# Patient Record
Sex: FEMALE | Race: WHITE
[De-identification: names, ages, dates, MRNs, and addresses within clinical notes are randomized per-mention and may not be internally consistent; named-entity substitution may affect disease eponyms.]

---

## 2018-01-09 ENCOUNTER — HOSPITAL ENCOUNTER (OUTPATIENT)
Dept: HOSPITAL 19 - COL.CARD | Age: 68
End: 2018-01-09
Attending: INTERNAL MEDICINE
Payer: MEDICARE

## 2018-01-09 DIAGNOSIS — R00.1: Primary | ICD-10-CM

## 2018-01-22 ENCOUNTER — HOSPITAL ENCOUNTER (OUTPATIENT)
Dept: HOSPITAL 19 - MC.RAD | Age: 68
End: 2018-01-22
Attending: INTERNAL MEDICINE
Payer: MEDICARE

## 2018-01-22 DIAGNOSIS — Z12.31: Primary | ICD-10-CM

## 2018-02-12 ENCOUNTER — HOSPITAL ENCOUNTER (OUTPATIENT)
Dept: HOSPITAL 19 - COL.RAD | Age: 68
End: 2018-02-12
Attending: INTERNAL MEDICINE
Payer: MEDICARE

## 2018-02-12 DIAGNOSIS — N28.1: Primary | ICD-10-CM

## 2018-02-12 DIAGNOSIS — N28.9: ICD-10-CM

## 2018-05-05 ENCOUNTER — HOSPITAL ENCOUNTER (EMERGENCY)
Dept: HOSPITAL 19 - COL.ER | Age: 68
Discharge: HOME | End: 2018-05-05
Payer: MEDICARE

## 2018-05-05 VITALS — SYSTOLIC BLOOD PRESSURE: 155 MMHG | HEART RATE: 42 BPM | DIASTOLIC BLOOD PRESSURE: 55 MMHG

## 2018-05-05 VITALS — HEIGHT: 65.98 IN | WEIGHT: 250.45 LBS | BODY MASS INDEX: 40.25 KG/M2

## 2018-05-05 VITALS — TEMPERATURE: 99.3 F

## 2018-05-05 DIAGNOSIS — Z79.82: ICD-10-CM

## 2018-05-05 DIAGNOSIS — Z79.84: ICD-10-CM

## 2018-05-05 DIAGNOSIS — M54.16: Primary | ICD-10-CM

## 2018-05-05 DIAGNOSIS — Z79.52: ICD-10-CM

## 2018-05-05 LAB
PH UR STRIP.AUTO: 7 [PH] (ref 5–8)
RBC # UR: (no result) /HPF
SP GR UR STRIP.AUTO: 1.01 (ref 1–1.03)
SQUAMOUS # URNS: (no result) /HPF
URN COLLECT METHOD CLASS: (no result)
UROBILINOGEN UR STRIP.AUTO-MCNC: 2 MG/DL

## 2019-01-23 ENCOUNTER — HOSPITAL ENCOUNTER (OUTPATIENT)
Dept: HOSPITAL 19 - COL.VAS | Age: 69
End: 2019-01-23
Attending: INTERNAL MEDICINE
Payer: MEDICARE

## 2019-01-23 DIAGNOSIS — I08.3: Primary | ICD-10-CM

## 2019-03-26 ENCOUNTER — HOSPITAL ENCOUNTER (OUTPATIENT)
Dept: HOSPITAL 19 - MC.RAD | Age: 69
End: 2019-03-26
Attending: INTERNAL MEDICINE
Payer: MEDICARE

## 2019-03-26 DIAGNOSIS — N63.42: ICD-10-CM

## 2019-03-26 DIAGNOSIS — Z12.31: Primary | ICD-10-CM

## 2019-04-01 ENCOUNTER — HOSPITAL ENCOUNTER (OUTPATIENT)
Dept: HOSPITAL 19 - MC.RAD | Age: 69
End: 2019-04-01
Attending: NURSE PRACTITIONER
Payer: MEDICARE

## 2019-04-01 DIAGNOSIS — N63.20: Primary | ICD-10-CM

## 2020-01-28 ENCOUNTER — HOSPITAL ENCOUNTER (OUTPATIENT)
Dept: HOSPITAL 19 - COL.VAS | Age: 70
End: 2020-01-28
Attending: INTERNAL MEDICINE
Payer: MEDICARE

## 2020-01-28 DIAGNOSIS — I08.0: Primary | ICD-10-CM

## 2021-02-17 ENCOUNTER — HOSPITAL ENCOUNTER (OUTPATIENT)
Dept: HOSPITAL 19 - MC.RAD | Age: 71
End: 2021-02-17
Attending: INTERNAL MEDICINE
Payer: MEDICARE

## 2021-02-17 DIAGNOSIS — Z12.31: Primary | ICD-10-CM

## 2021-03-26 ENCOUNTER — HOSPITAL ENCOUNTER (OUTPATIENT)
Dept: HOSPITAL 19 - EUO | Age: 71
Discharge: HOME | End: 2021-03-26
Payer: MEDICARE

## 2021-03-26 VITALS — DIASTOLIC BLOOD PRESSURE: 75 MMHG | SYSTOLIC BLOOD PRESSURE: 118 MMHG | HEART RATE: 44 BPM | TEMPERATURE: 98.7 F

## 2021-03-26 VITALS — SYSTOLIC BLOOD PRESSURE: 144 MMHG | DIASTOLIC BLOOD PRESSURE: 86 MMHG | TEMPERATURE: 98.6 F | HEART RATE: 51 BPM

## 2021-03-26 VITALS — DIASTOLIC BLOOD PRESSURE: 71 MMHG | SYSTOLIC BLOOD PRESSURE: 135 MMHG | TEMPERATURE: 98.6 F | HEART RATE: 47 BPM

## 2021-03-26 VITALS — HEART RATE: 45 BPM | TEMPERATURE: 98.8 F | SYSTOLIC BLOOD PRESSURE: 136 MMHG | DIASTOLIC BLOOD PRESSURE: 69 MMHG

## 2021-03-26 VITALS — DIASTOLIC BLOOD PRESSURE: 62 MMHG | HEART RATE: 47 BPM | TEMPERATURE: 98.6 F | SYSTOLIC BLOOD PRESSURE: 137 MMHG

## 2021-03-26 VITALS — TEMPERATURE: 98.4 F | DIASTOLIC BLOOD PRESSURE: 71 MMHG | SYSTOLIC BLOOD PRESSURE: 126 MMHG | HEART RATE: 48 BPM

## 2021-03-26 VITALS — TEMPERATURE: 98.7 F | DIASTOLIC BLOOD PRESSURE: 74 MMHG | HEART RATE: 46 BPM | SYSTOLIC BLOOD PRESSURE: 113 MMHG

## 2021-03-26 VITALS — HEART RATE: 44 BPM | SYSTOLIC BLOOD PRESSURE: 121 MMHG | DIASTOLIC BLOOD PRESSURE: 70 MMHG

## 2021-03-26 VITALS — SYSTOLIC BLOOD PRESSURE: 123 MMHG | TEMPERATURE: 98.5 F | HEART RATE: 50 BPM | DIASTOLIC BLOOD PRESSURE: 70 MMHG

## 2021-03-26 VITALS — DIASTOLIC BLOOD PRESSURE: 90 MMHG | TEMPERATURE: 98.8 F | SYSTOLIC BLOOD PRESSURE: 156 MMHG | HEART RATE: 45 BPM

## 2021-03-26 VITALS — BODY MASS INDEX: 32.74 KG/M2 | HEIGHT: 65.98 IN | WEIGHT: 203.71 LBS

## 2021-03-26 VITALS — HEART RATE: 46 BPM | SYSTOLIC BLOOD PRESSURE: 136 MMHG | TEMPERATURE: 98.7 F | DIASTOLIC BLOOD PRESSURE: 69 MMHG

## 2021-03-26 DIAGNOSIS — D53.9: Primary | ICD-10-CM

## 2021-03-26 PROCEDURE — P9040 RBC LEUKOREDUCED IRRADIATED: HCPCS

## 2021-04-16 ENCOUNTER — HOSPITAL ENCOUNTER (OUTPATIENT)
Dept: HOSPITAL 19 - SDCO | Age: 71
Discharge: HOME | End: 2021-04-16
Attending: SURGERY
Payer: MEDICARE

## 2021-04-16 VITALS — TEMPERATURE: 98 F

## 2021-04-16 VITALS — DIASTOLIC BLOOD PRESSURE: 38 MMHG | SYSTOLIC BLOOD PRESSURE: 119 MMHG | HEART RATE: 48 BPM

## 2021-04-16 VITALS — DIASTOLIC BLOOD PRESSURE: 43 MMHG | SYSTOLIC BLOOD PRESSURE: 126 MMHG | HEART RATE: 49 BPM

## 2021-04-16 VITALS — SYSTOLIC BLOOD PRESSURE: 141 MMHG | HEART RATE: 59 BPM | DIASTOLIC BLOOD PRESSURE: 47 MMHG

## 2021-04-16 VITALS — DIASTOLIC BLOOD PRESSURE: 36 MMHG | HEART RATE: 47 BPM | SYSTOLIC BLOOD PRESSURE: 124 MMHG

## 2021-04-16 VITALS — WEIGHT: 207.23 LBS | HEIGHT: 66 IN | BODY MASS INDEX: 33.3 KG/M2

## 2021-04-16 VITALS — DIASTOLIC BLOOD PRESSURE: 46 MMHG | HEART RATE: 56 BPM | TEMPERATURE: 98.2 F | SYSTOLIC BLOOD PRESSURE: 161 MMHG

## 2021-04-16 DIAGNOSIS — Z79.84: ICD-10-CM

## 2021-04-16 DIAGNOSIS — Z80.8: ICD-10-CM

## 2021-04-16 DIAGNOSIS — I10: ICD-10-CM

## 2021-04-16 DIAGNOSIS — G47.33: ICD-10-CM

## 2021-04-16 DIAGNOSIS — D53.9: ICD-10-CM

## 2021-04-16 DIAGNOSIS — Z20.822: ICD-10-CM

## 2021-04-16 DIAGNOSIS — J45.909: ICD-10-CM

## 2021-04-16 DIAGNOSIS — Z79.899: ICD-10-CM

## 2021-04-16 DIAGNOSIS — D63.0: ICD-10-CM

## 2021-04-16 DIAGNOSIS — Z79.890: ICD-10-CM

## 2021-04-16 DIAGNOSIS — D46.9: Primary | ICD-10-CM

## 2021-04-16 DIAGNOSIS — E11.42: ICD-10-CM

## 2021-04-16 DIAGNOSIS — E07.9: ICD-10-CM

## 2021-04-16 DIAGNOSIS — Z80.0: ICD-10-CM

## 2021-04-16 DIAGNOSIS — E78.5: ICD-10-CM

## 2021-04-16 PROCEDURE — C1788 PORT, INDWELLING, IMP: HCPCS

## 2021-04-16 NOTE — NUR
TO RM 8 PER CART FROM OR. ALERT ORIENTED X3, TALKING TO STAFF AND .
DRESSING OVER PORT SITE CLEAN DRY INTACT WITH PORT ACCESSED.
INCISION ABOVE PORT SITE WITH WEISS SET CLEAN DRY INTACT.
DENIES PAIN OR DISCOMFORT

## 2021-04-16 NOTE — NUR
RECEIVED DISCHARGE INSTRUCTIONS AND VERBALIZED UNDERSTANDING.
PATIENT TO GO TO [facility] UPON DISCHARGE.
DISCONTINUED IV AND INT- CATHETER INTACT.
PATIENT GETTING DRESSED.

## 2021-05-01 ENCOUNTER — HOSPITAL ENCOUNTER (OUTPATIENT)
Dept: HOSPITAL 19 - EUO | Age: 71
LOS: 1 days | Discharge: HOME | End: 2021-05-02
Payer: MEDICARE

## 2021-05-01 VITALS — HEART RATE: 44 BPM | SYSTOLIC BLOOD PRESSURE: 135 MMHG | DIASTOLIC BLOOD PRESSURE: 53 MMHG

## 2021-05-01 VITALS — HEART RATE: 44 BPM | SYSTOLIC BLOOD PRESSURE: 134 MMHG | DIASTOLIC BLOOD PRESSURE: 44 MMHG

## 2021-05-01 VITALS — DIASTOLIC BLOOD PRESSURE: 39 MMHG | SYSTOLIC BLOOD PRESSURE: 127 MMHG | HEART RATE: 49 BPM | TEMPERATURE: 98.1 F

## 2021-05-01 VITALS — HEART RATE: 51 BPM | SYSTOLIC BLOOD PRESSURE: 134 MMHG | TEMPERATURE: 98.3 F | DIASTOLIC BLOOD PRESSURE: 46 MMHG

## 2021-05-01 VITALS — DIASTOLIC BLOOD PRESSURE: 43 MMHG | HEART RATE: 45 BPM | SYSTOLIC BLOOD PRESSURE: 136 MMHG

## 2021-05-01 VITALS — HEART RATE: 44 BPM | DIASTOLIC BLOOD PRESSURE: 49 MMHG | SYSTOLIC BLOOD PRESSURE: 1138 MMHG

## 2021-05-01 VITALS — DIASTOLIC BLOOD PRESSURE: 44 MMHG | HEART RATE: 47 BPM | SYSTOLIC BLOOD PRESSURE: 128 MMHG

## 2021-05-01 DIAGNOSIS — R53.83: ICD-10-CM

## 2021-05-01 DIAGNOSIS — D46.21: Primary | ICD-10-CM

## 2021-05-01 DIAGNOSIS — D53.9: ICD-10-CM

## 2021-05-01 PROCEDURE — P9040 RBC LEUKOREDUCED IRRADIATED: HCPCS

## 2021-05-01 NOTE — NUR
Transfusion complete, port deaccessed, flushed with NS, followed by heparin
flush.  Pt tolerated transfusion well with no adverse reactions noted.

## 2021-07-02 ENCOUNTER — HOSPITAL ENCOUNTER (OUTPATIENT)
Dept: HOSPITAL 19 - EUO | Age: 71
LOS: 1 days | Discharge: HOME | End: 2021-07-03
Payer: MEDICARE

## 2021-07-02 VITALS — WEIGHT: 207.23 LBS | BODY MASS INDEX: 33.3 KG/M2 | HEIGHT: 65.98 IN

## 2021-07-02 DIAGNOSIS — D53.9: ICD-10-CM

## 2021-07-02 DIAGNOSIS — D46.21: Primary | ICD-10-CM

## 2021-07-02 PROCEDURE — P9040 RBC LEUKOREDUCED IRRADIATED: HCPCS

## 2021-07-03 VITALS — TEMPERATURE: 98.2 F | SYSTOLIC BLOOD PRESSURE: 142 MMHG | HEART RATE: 50 BPM | DIASTOLIC BLOOD PRESSURE: 62 MMHG

## 2021-07-03 VITALS — TEMPERATURE: 98.1 F | HEART RATE: 54 BPM | DIASTOLIC BLOOD PRESSURE: 52 MMHG | SYSTOLIC BLOOD PRESSURE: 132 MMHG

## 2021-07-03 VITALS — TEMPERATURE: 98.3 F | HEART RATE: 49 BPM | DIASTOLIC BLOOD PRESSURE: 51 MMHG | SYSTOLIC BLOOD PRESSURE: 137 MMHG

## 2021-07-03 VITALS — TEMPERATURE: 98.4 F | HEART RATE: 49 BPM | SYSTOLIC BLOOD PRESSURE: 118 MMHG | DIASTOLIC BLOOD PRESSURE: 41 MMHG

## 2021-07-03 VITALS — DIASTOLIC BLOOD PRESSURE: 52 MMHG | SYSTOLIC BLOOD PRESSURE: 133 MMHG | HEART RATE: 48 BPM | TEMPERATURE: 98 F

## 2021-07-03 VITALS — DIASTOLIC BLOOD PRESSURE: 57 MMHG | SYSTOLIC BLOOD PRESSURE: 139 MMHG | HEART RATE: 50 BPM | TEMPERATURE: 98.1 F

## 2021-07-03 VITALS — HEART RATE: 48 BPM | TEMPERATURE: 98.5 F | SYSTOLIC BLOOD PRESSURE: 131 MMHG | DIASTOLIC BLOOD PRESSURE: 56 MMHG

## 2021-07-03 NOTE — NUR
Patient tolerated blood transfusion well, vital signs stable, denies any
complications, PORT flushed and heparinized, I escorted her to the ER exit
where she is leaving with her

## 2022-04-04 ENCOUNTER — HOSPITAL ENCOUNTER (INPATIENT)
Dept: HOSPITAL 19 - COL.ER | Age: 72
LOS: 4 days | Discharge: ANOTHER HOSPITAL | DRG: 189 | End: 2022-04-08
Attending: INTERNAL MEDICINE | Admitting: INTERNAL MEDICINE
Payer: MEDICARE

## 2022-04-04 VITALS — OXYGEN SATURATION: 94 %

## 2022-04-04 VITALS — OXYGEN SATURATION: 93 %

## 2022-04-04 VITALS
SYSTOLIC BLOOD PRESSURE: 132 MMHG | TEMPERATURE: 96.9 F | OXYGEN SATURATION: 96 % | DIASTOLIC BLOOD PRESSURE: 61 MMHG | HEART RATE: 58 BPM

## 2022-04-04 VITALS — OXYGEN SATURATION: 95 %

## 2022-04-04 VITALS — OXYGEN SATURATION: 96 %

## 2022-04-04 VITALS — WEIGHT: 197.98 LBS | BODY MASS INDEX: 31.82 KG/M2 | HEIGHT: 65.98 IN

## 2022-04-04 DIAGNOSIS — E03.9: ICD-10-CM

## 2022-04-04 DIAGNOSIS — Z87.442: ICD-10-CM

## 2022-04-04 DIAGNOSIS — J45.901: ICD-10-CM

## 2022-04-04 DIAGNOSIS — J96.01: Primary | ICD-10-CM

## 2022-04-04 DIAGNOSIS — Z20.822: ICD-10-CM

## 2022-04-04 DIAGNOSIS — E83.42: ICD-10-CM

## 2022-04-04 DIAGNOSIS — I10: ICD-10-CM

## 2022-04-04 DIAGNOSIS — Z23: ICD-10-CM

## 2022-04-04 DIAGNOSIS — D50.9: ICD-10-CM

## 2022-04-04 DIAGNOSIS — E78.5: ICD-10-CM

## 2022-04-04 DIAGNOSIS — I48.0: ICD-10-CM

## 2022-04-04 DIAGNOSIS — E88.81: ICD-10-CM

## 2022-04-04 DIAGNOSIS — Z96.652: ICD-10-CM

## 2022-04-04 DIAGNOSIS — E11.9: ICD-10-CM

## 2022-04-04 DIAGNOSIS — D46.9: ICD-10-CM

## 2022-04-04 DIAGNOSIS — Z88.0: ICD-10-CM

## 2022-04-04 LAB
ALBUMIN SERPL-MCNC: 3.1 GM/DL (ref 3.4–4.8)
ALP SERPL-CCNC: 75 U/L (ref 40–150)
ALT SERPL-CCNC: 21 U/L (ref 0–55)
ANION GAP SERPL CALC-SCNC: 10 MMOL/L (ref 7–16)
AST SERPL-CCNC: 14 U/L (ref 5–34)
BASOPHILS # BLD: 0 K/MM3 (ref 0–0.2)
BASOPHILS NFR BLD AUTO: 0.3 % (ref 0–2)
BILIRUB SERPL-MCNC: 2.4 MG/DL (ref 0.2–1.2)
BUN SERPL-MCNC: 22 MG/DL (ref 10–20)
CALCIUM SERPL-MCNC: 9 MG/DL (ref 8.4–10.2)
CHLORIDE SERPL-SCNC: 104 MMOL/L (ref 98–107)
CO2 SERPL-SCNC: 22 MMOL/L (ref 23–31)
CREAT SERPL-SCNC: 0.72 MG/DL (ref 0.57–1.11)
EOSINOPHIL # BLD: 0.4 K/MM3 (ref 0–0.7)
EOSINOPHIL NFR BLD: 3.4 % (ref 0–4)
ERYTHROCYTE [DISTWIDTH] IN BLOOD BY AUTOMATED COUNT: 13.9 % (ref 11.5–14.5)
GLUCOSE SERPL-MCNC: 121 MG/DL (ref 70–99)
GRANULOCYTES # BLD AUTO: 81.3 % (ref 42.2–75.2)
HCT VFR BLD AUTO: 33.7 % (ref 37–47)
HGB BLD-MCNC: 11.1 G/DL (ref 12.5–16)
LYMPHOCYTES # BLD: 0.7 K/MM3 (ref 1.2–3.4)
LYMPHOCYTES NFR BLD: 6.3 % (ref 20–51)
MCH RBC QN AUTO: 32 PG (ref 27–31)
MCHC RBC AUTO-ENTMCNC: 33 G/DL (ref 33–37)
MCV RBC AUTO: 98 FL (ref 80–100)
MONOCYTES # BLD: 0.9 K/MM3 (ref 0.1–0.6)
MONOCYTES NFR BLD AUTO: 8.3 % (ref 1.7–9.3)
NEUTROPHILS # BLD: 9 K/MM3 (ref 1.4–6.5)
PLATELET # BLD AUTO: 181 K/MM3 (ref 130–400)
PMV BLD AUTO: 8.9 FL (ref 7.4–10.4)
POTASSIUM SERPL-SCNC: 4 MMOL/L (ref 3.5–4.5)
PROT SERPL-MCNC: 6.2 GM/DL (ref 6.2–8.1)
RBC # BLD AUTO: 3.43 M/MM3 (ref 4.1–5.3)
SODIUM SERPL-SCNC: 136 MMOL/L (ref 136–145)

## 2022-04-05 VITALS — OXYGEN SATURATION: 94 %

## 2022-04-05 VITALS — DIASTOLIC BLOOD PRESSURE: 49 MMHG | SYSTOLIC BLOOD PRESSURE: 128 MMHG | HEART RATE: 53 BPM | TEMPERATURE: 98.1 F

## 2022-04-05 VITALS — OXYGEN SATURATION: 93 %

## 2022-04-05 VITALS — OXYGEN SATURATION: 92 %

## 2022-04-05 VITALS — OXYGEN SATURATION: 96 %

## 2022-04-05 VITALS — OXYGEN SATURATION: 91 %

## 2022-04-05 VITALS
SYSTOLIC BLOOD PRESSURE: 132 MMHG | HEART RATE: 54 BPM | TEMPERATURE: 96.9 F | OXYGEN SATURATION: 92 % | DIASTOLIC BLOOD PRESSURE: 61 MMHG

## 2022-04-05 VITALS — OXYGEN SATURATION: 97 %

## 2022-04-05 VITALS — DIASTOLIC BLOOD PRESSURE: 47 MMHG | HEART RATE: 51 BPM | TEMPERATURE: 98.4 F | SYSTOLIC BLOOD PRESSURE: 123 MMHG

## 2022-04-05 VITALS — HEART RATE: 50 BPM | DIASTOLIC BLOOD PRESSURE: 46 MMHG | TEMPERATURE: 98 F | SYSTOLIC BLOOD PRESSURE: 118 MMHG

## 2022-04-05 VITALS — OXYGEN SATURATION: 95 %

## 2022-04-05 VITALS — HEART RATE: 48 BPM | DIASTOLIC BLOOD PRESSURE: 47 MMHG | TEMPERATURE: 97.8 F | SYSTOLIC BLOOD PRESSURE: 117 MMHG

## 2022-04-05 VITALS — DIASTOLIC BLOOD PRESSURE: 51 MMHG | SYSTOLIC BLOOD PRESSURE: 109 MMHG | HEART RATE: 44 BPM | TEMPERATURE: 98 F

## 2022-04-05 VITALS — OXYGEN SATURATION: 89 %

## 2022-04-05 VITALS — TEMPERATURE: 97.9 F | SYSTOLIC BLOOD PRESSURE: 126 MMHG | DIASTOLIC BLOOD PRESSURE: 52 MMHG | HEART RATE: 48 BPM

## 2022-04-05 VITALS — OXYGEN SATURATION: 90 %

## 2022-04-05 VITALS — OXYGEN SATURATION: 99 %

## 2022-04-05 VITALS — OXYGEN SATURATION: 83 %

## 2022-04-05 LAB
ANION GAP SERPL CALC-SCNC: 11 MMOL/L (ref 7–16)
BUN SERPL-MCNC: 22 MG/DL (ref 10–20)
CALCIUM SERPL-MCNC: 8.7 MG/DL (ref 8.4–10.2)
CHLORIDE SERPL-SCNC: 107 MMOL/L (ref 98–107)
CO2 SERPL-SCNC: 20 MMOL/L (ref 23–31)
CREAT SERPL-SCNC: 0.72 MG/DL (ref 0.57–1.11)
DACRYOCYTES BLD QL SMEAR: (no result)
ERYTHROCYTE [DISTWIDTH] IN BLOOD BY AUTOMATED COUNT: 13.5 % (ref 11.5–14.5)
GLUCOSE SERPL-MCNC: 197 MG/DL (ref 70–99)
HCT VFR BLD AUTO: 31.7 % (ref 37–47)
HGB BLD-MCNC: 10.4 G/DL (ref 12.5–16)
LYMPHOCYTES NFR BLD MANUAL: 3 % (ref 20–51)
MCH RBC QN AUTO: 33 PG (ref 27–31)
MCHC RBC AUTO-ENTMCNC: 33 G/DL (ref 33–37)
MCV RBC AUTO: 100 FL (ref 80–100)
NEUTS SEG NFR BLD MANUAL: 97 % (ref 42–75.2)
PLATELET # BLD AUTO: 181 K/MM3 (ref 130–400)
PMV BLD AUTO: 8.7 FL (ref 7.4–10.4)
POLYCHROMASIA BLD QL SMEAR: (no result)
POTASSIUM SERPL-SCNC: 4.5 MMOL/L (ref 3.5–4.5)
RBC # BLD AUTO: 3.18 M/MM3 (ref 4.1–5.3)
SCHISTOCYTES BLD QL SMEAR: (no result)
SODIUM SERPL-SCNC: 138 MMOL/L (ref 136–145)

## 2022-04-05 NOTE — NUR
ELPIDIO FROM LAB, CALLED RESULTS FROM 1 BLOOD CULTURE BOTTLE OF GRAM + COCCI
W/GRAM STAIN RESULT OF STAPHYLOCOCCUS.

## 2022-04-05 NOTE — NUR
met with patient and her , Puma (ph#[phone]) to
discuss discharge planning. [place] [facility] and sees Dr. Victor M De La Paz for primary care. Patient obtains medications
from [facility] in  or by mail from the [facility] pharmacy. Patient has a front
wheeled walker and no other DME at home. Patient reports independence with
[facility] and plans to return home at time of discharge. Patient does not normally
wear oxygen although she is currently requiring it. Patient is hopeful she
will not need it at time of discharge. Patient reports her children in [place],
Amrit and Walter are her DPOA-HC. SW was unable to locate copy in EMR.
 
Discharge Plan: Home

## 2022-04-06 VITALS — TEMPERATURE: 97.9 F | HEART RATE: 50 BPM | SYSTOLIC BLOOD PRESSURE: 137 MMHG | DIASTOLIC BLOOD PRESSURE: 66 MMHG

## 2022-04-06 VITALS — TEMPERATURE: 97.4 F | SYSTOLIC BLOOD PRESSURE: 125 MMHG | HEART RATE: 56 BPM | DIASTOLIC BLOOD PRESSURE: 48 MMHG

## 2022-04-06 VITALS — SYSTOLIC BLOOD PRESSURE: 134 MMHG | TEMPERATURE: 98.4 F | DIASTOLIC BLOOD PRESSURE: 55 MMHG | HEART RATE: 51 BPM

## 2022-04-06 VITALS — DIASTOLIC BLOOD PRESSURE: 47 MMHG | TEMPERATURE: 97.6 F | HEART RATE: 49 BPM | SYSTOLIC BLOOD PRESSURE: 124 MMHG

## 2022-04-06 VITALS — SYSTOLIC BLOOD PRESSURE: 120 MMHG | HEART RATE: 41 BPM | TEMPERATURE: 97.2 F | DIASTOLIC BLOOD PRESSURE: 46 MMHG

## 2022-04-06 VITALS — TEMPERATURE: 97.8 F | DIASTOLIC BLOOD PRESSURE: 74 MMHG | SYSTOLIC BLOOD PRESSURE: 117 MMHG | HEART RATE: 49 BPM

## 2022-04-06 LAB
ANION GAP SERPL CALC-SCNC: 10 MMOL/L (ref 7–16)
BUN SERPL-MCNC: 33 MG/DL (ref 10–20)
CALCIUM SERPL-MCNC: 8.7 MG/DL (ref 8.4–10.2)
CHLORIDE SERPL-SCNC: 110 MMOL/L (ref 98–107)
CO2 SERPL-SCNC: 19 MMOL/L (ref 23–31)
CREAT SERPL-SCNC: 0.79 MG/DL (ref 0.57–1.11)
DACRYOCYTES BLD QL SMEAR: (no result)
ERYTHROCYTE [DISTWIDTH] IN BLOOD BY AUTOMATED COUNT: 13.5 % (ref 11.5–14.5)
GLUCOSE SERPL-MCNC: 267 MG/DL (ref 70–99)
HCT VFR BLD AUTO: 32.5 % (ref 37–47)
HGB BLD-MCNC: 10.2 G/DL (ref 12.5–16)
HYPOCHROMIA BLD QL SMEAR: (no result)
LYMPHOCYTES NFR BLD MANUAL: 6 % (ref 20–51)
MCH RBC QN AUTO: 32 PG (ref 27–31)
MCHC RBC AUTO-ENTMCNC: 31 G/DL (ref 33–37)
MCV RBC AUTO: 103 FL (ref 80–100)
MONOCYTES NFR BLD: 1 % (ref 1.7–9.3)
NEUTS SEG NFR BLD MANUAL: 93 % (ref 42–75.2)
PLATELET # BLD AUTO: 189 K/MM3 (ref 130–400)
PLATELET BLD QL SMEAR: NORMAL
PMV BLD AUTO: 9.1 FL (ref 7.4–10.4)
POTASSIUM SERPL-SCNC: 4.3 MMOL/L (ref 3.5–4.5)
RBC # BLD AUTO: 3.15 M/MM3 (ref 4.1–5.3)
SCHISTOCYTES BLD QL SMEAR: (no result)
SODIUM SERPL-SCNC: 139 MMOL/L (ref 136–145)

## 2022-04-06 NOTE — NUR
Pt assessment complete. Pt is laying in bed upon entry, she is A/O x4. Her
breathing is currently even and unlabored on CPAP, switched to NC. Pt
currently denies SOB. Reports very intermittent cough. No pain at this time.
Denies N/V. No further needs, call light within reach.

## 2022-04-06 NOTE — NUR
Vancomycin Initial Dosing Pharmacy Note
Ordering provider: Alek Atkinson MD
Indication/duration: Bacteremia x 5 days
Relevant comorbidities: HTN, MDS w/ hx of BMT
LABS: WBC = 8.4, SCr = 0.72
Recommendation: Will draw troughs and follow levels.
Loading dose: 1.5 grams
Maintenance dose: 1 gram every 12 hours
Trough goal: 15-20 ug/mL

## 2022-04-06 NOTE — NUR
PATIENT IS A&O. NOTED SB IN THE 40'S ON TELE. ALL OTHER VSS. PATIENT WAS
WEANED OFF OXYGEN TODAY AND IS NOW 94% ON RA. NO C/O COUGH, SOA OR WHEEZING.
NOTED DEMINISHED A&P LUNG ONTIVEROS. CLEAR IN UPPER LUNG ONTIVEROS. POSITIVE BC. IV
ABX INFUSING VIA PUMP INTO RIGHT AC IV PER ORDERS. HS MEDS GIVEN, TOLERATED
WELL. EVENING BS , NO SSI REQUIRED. GENERAL DIET. PATIENT SITTING UP
IN BEDSIDE CHAIR WITH NO COMPLAINTS. HEAD TO TOE ASSESSMENT COMPLETE, SEE
CHARTING. NO OTHER NEEDS AT THIS TIME. CALL LIGHT IN REACH.

## 2022-04-07 VITALS — TEMPERATURE: 97.3 F | DIASTOLIC BLOOD PRESSURE: 51 MMHG | HEART RATE: 49 BPM | SYSTOLIC BLOOD PRESSURE: 131 MMHG

## 2022-04-07 VITALS — TEMPERATURE: 97.8 F | SYSTOLIC BLOOD PRESSURE: 127 MMHG | HEART RATE: 50 BPM | DIASTOLIC BLOOD PRESSURE: 45 MMHG

## 2022-04-07 VITALS — HEART RATE: 52 BPM | SYSTOLIC BLOOD PRESSURE: 135 MMHG | DIASTOLIC BLOOD PRESSURE: 54 MMHG | TEMPERATURE: 98 F

## 2022-04-07 VITALS — TEMPERATURE: 97.2 F | HEART RATE: 47 BPM | DIASTOLIC BLOOD PRESSURE: 47 MMHG | SYSTOLIC BLOOD PRESSURE: 123 MMHG

## 2022-04-07 VITALS — HEART RATE: 45 BPM | DIASTOLIC BLOOD PRESSURE: 49 MMHG | TEMPERATURE: 97.7 F | SYSTOLIC BLOOD PRESSURE: 129 MMHG

## 2022-04-07 VITALS — TEMPERATURE: 97.2 F | SYSTOLIC BLOOD PRESSURE: 134 MMHG | DIASTOLIC BLOOD PRESSURE: 52 MMHG | HEART RATE: 46 BPM

## 2022-04-07 LAB
ANION GAP SERPL CALC-SCNC: 8 MMOL/L (ref 7–16)
BASOPHILS # BLD: 0 K/MM3 (ref 0–0.2)
BASOPHILS NFR BLD AUTO: 0 % (ref 0–2)
BUN SERPL-MCNC: 36 MG/DL (ref 10–20)
CALCIUM SERPL-MCNC: 8.6 MG/DL (ref 8.4–10.2)
CHLORIDE SERPL-SCNC: 111 MMOL/L (ref 98–107)
CO2 SERPL-SCNC: 21 MMOL/L (ref 23–31)
CREAT SERPL-SCNC: 0.79 MG/DL (ref 0.57–1.11)
EOSINOPHIL # BLD: 0 K/MM3 (ref 0–0.7)
EOSINOPHIL NFR BLD: 0 % (ref 0–4)
ERYTHROCYTE [DISTWIDTH] IN BLOOD BY AUTOMATED COUNT: 13.6 % (ref 11.5–14.5)
GLUCOSE SERPL-MCNC: 152 MG/DL (ref 70–99)
GRANULOCYTES # BLD AUTO: 89.5 % (ref 42.2–75.2)
HCT VFR BLD AUTO: 31.5 % (ref 37–47)
HGB BLD-MCNC: 10.2 G/DL (ref 12.5–16)
LYMPHOCYTES # BLD: 0.5 K/MM3 (ref 1.2–3.4)
LYMPHOCYTES NFR BLD: 5.8 % (ref 20–51)
MAGNESIUM SERPL-MCNC: 2.6 MG/DL (ref 1.6–2.6)
MCH RBC QN AUTO: 32 PG (ref 27–31)
MCHC RBC AUTO-ENTMCNC: 32 G/DL (ref 33–37)
MCV RBC AUTO: 99 FL (ref 80–100)
MONOCYTES # BLD: 0.3 K/MM3 (ref 0.1–0.6)
MONOCYTES NFR BLD AUTO: 3.8 % (ref 1.7–9.3)
NEUTROPHILS # BLD: 7 K/MM3 (ref 1.4–6.5)
PLATELET # BLD AUTO: 195 K/MM3 (ref 130–400)
PMV BLD AUTO: 8.9 FL (ref 7.4–10.4)
POTASSIUM SERPL-SCNC: 4.6 MMOL/L (ref 3.5–4.5)
RBC # BLD AUTO: 3.19 M/MM3 (ref 4.1–5.3)
SODIUM SERPL-SCNC: 140 MMOL/L (ref 136–145)

## 2022-04-07 NOTE — NUR
TELE CALLED AND REPORTED PATIENT HR ON TELE DROPPED DOWN INTO THE UPPER 30'S
WHILE SLEEPING. PATIENT WAS SOUND ASLEEP WITH C-PAP ON WHEN NURSING WOKE HER
UP. ASYMPTOMATIC. ALL OTHER VSS. NOTIFIED HOSPITALIST, SEE MEDS ON HOLD. CARDS
TO SEE PATIENT TODAY.

## 2022-04-07 NOTE — NUR
The patient is to discharge back home with her  today, 4/7. SW met with
the patient and presented and read the IM form outloud to her. The patient
verbalized understanding and of discharge today. She signed the form and FARHAT
provided her with a copy. No additional needs at this time.

## 2022-04-07 NOTE — NUR
TX GIVEN VIA MOUTHPIECE, TOLERATED WELL.  PT LEFT ON ROOM AIR.  HOME CPAP UNIT
SETUP AND READY WHENEVER PT WANTS TO PUT IT ON.

## 2022-04-07 NOTE — NUR
Shift assessment performed. Scheduled medications given. Morning cares
provided by student nurse under the supervision of a licensed professional.
VSS have been stable, but heart rate has been bradycardic. Patient denies any
pain discomfort, SOA, or further needs at this time. Call light in reach.
Patient A&O.

## 2022-04-08 VITALS — TEMPERATURE: 98 F | DIASTOLIC BLOOD PRESSURE: 55 MMHG | SYSTOLIC BLOOD PRESSURE: 151 MMHG | HEART RATE: 49 BPM

## 2022-04-08 VITALS — SYSTOLIC BLOOD PRESSURE: 153 MMHG | TEMPERATURE: 98.1 F | HEART RATE: 51 BPM | DIASTOLIC BLOOD PRESSURE: 52 MMHG

## 2022-04-08 VITALS — DIASTOLIC BLOOD PRESSURE: 55 MMHG | TEMPERATURE: 97.9 F | HEART RATE: 55 BPM | SYSTOLIC BLOOD PRESSURE: 152 MMHG

## 2022-04-08 VITALS — TEMPERATURE: 97.9 F | HEART RATE: 55 BPM | SYSTOLIC BLOOD PRESSURE: 152 MMHG | DIASTOLIC BLOOD PRESSURE: 55 MMHG

## 2022-04-08 VITALS — SYSTOLIC BLOOD PRESSURE: 156 MMHG | DIASTOLIC BLOOD PRESSURE: 86 MMHG

## 2022-04-08 LAB
ALBUMIN SERPL-MCNC: 2.8 GM/DL (ref 3.4–4.8)
ALP SERPL-CCNC: 73 U/L (ref 40–150)
ALT SERPL-CCNC: 29 U/L (ref 0–55)
ANION GAP SERPL CALC-SCNC: 10 MMOL/L (ref 7–16)
AST SERPL-CCNC: 12 U/L (ref 5–34)
BILIRUB SERPL-MCNC: 1.3 MG/DL (ref 0.2–1.2)
BUN SERPL-MCNC: 25 MG/DL (ref 10–20)
CALCIUM SERPL-MCNC: 8.5 MG/DL (ref 8.4–10.2)
CHLORIDE SERPL-SCNC: 108 MMOL/L (ref 98–107)
CO2 SERPL-SCNC: 21 MMOL/L (ref 23–31)
CREAT SERPL-SCNC: 0.76 MG/DL (ref 0.57–1.11)
ERYTHROCYTE [DISTWIDTH] IN BLOOD BY AUTOMATED COUNT: 13.8 % (ref 11.5–14.5)
GLUCOSE SERPL-MCNC: 237 MG/DL (ref 70–99)
HCT VFR BLD AUTO: 34.1 % (ref 37–47)
HGB BLD-MCNC: 11.2 G/DL (ref 12.5–16)
HYPOCHROMIA BLD QL SMEAR: (no result)
LYMPHOCYTES NFR BLD MANUAL: 7 % (ref 20–51)
MCH RBC QN AUTO: 33 PG (ref 27–31)
MCHC RBC AUTO-ENTMCNC: 33 G/DL (ref 33–37)
MCV RBC AUTO: 99 FL (ref 80–100)
MONOCYTES NFR BLD: 3 % (ref 1.7–9.3)
NEUTS SEG NFR BLD MANUAL: 90 % (ref 42–75.2)
PLATELET # BLD AUTO: 221 K/MM3 (ref 130–400)
PLATELET BLD QL SMEAR: NORMAL
PMV BLD AUTO: 8.5 FL (ref 7.4–10.4)
POTASSIUM SERPL-SCNC: 4.1 MMOL/L (ref 3.5–4.5)
PROT SERPL-MCNC: 5.5 GM/DL (ref 6.2–8.1)
RBC # BLD AUTO: 3.44 M/MM3 (ref 4.1–5.3)
SODIUM SERPL-SCNC: 139 MMOL/L (ref 136–145)

## 2022-04-08 NOTE — NUR
ASSESSMENT COMPLETE FOR THIS SHIFT. PT RESTING IN BED TEXTING ON HER PHONE. PT
DENIED PAIN, PALPITATIONS, N,V,D, SOB OR DIZZINESS. PT CONTINUES TO BE
BRADYCARDIC, ASYMPTOMATIC. WILL CONTINUE TO MONITOR. PT EXPRESSED NO OTHER
NEEDS AT THIS TIME. CALL LIGHT WITHIN REACH.

## 2022-04-08 NOTE — NUR
Shift assessment performed. Patient is bradycardic, all other VSS. Patient is
asymptomatic. Patient A&O. Patient denies any pain, discomfort, SOA, or
further needs at this time. +1 edema noted on BLE. Lung sound clear. Bowel
sound present in all four quadrants. No other skin issues noted. Call light in
reach.

## 2022-04-08 NOTE — NUR
Patient deemed fit for transfer to [facility]. Scheduled medications given by
student nurse under the supervision of a licensed professional. Patient
bradycardic, all other VSS. Patient A&O. Patient denies any pain, discomfort,
SOA, or further needs at this time. Patient escorted from [facility] by EMS.
Report called to DAMON Le at [facility].